# Patient Record
Sex: FEMALE | Race: WHITE | Employment: OTHER | URBAN - METROPOLITAN AREA
[De-identification: names, ages, dates, MRNs, and addresses within clinical notes are randomized per-mention and may not be internally consistent; named-entity substitution may affect disease eponyms.]

---

## 2017-01-06 ENCOUNTER — OFFICE VISIT (OUTPATIENT)
Dept: FAMILY MEDICINE | Facility: CLINIC | Age: 37
End: 2017-01-06
Payer: COMMERCIAL

## 2017-01-06 VITALS
DIASTOLIC BLOOD PRESSURE: 80 MMHG | SYSTOLIC BLOOD PRESSURE: 108 MMHG | WEIGHT: 184.25 LBS | BODY MASS INDEX: 27.92 KG/M2 | HEIGHT: 68 IN | HEART RATE: 60 BPM

## 2017-01-06 DIAGNOSIS — B07.0 PLANTAR WARTS: Primary | ICD-10-CM

## 2017-01-06 PROCEDURE — 17110 DESTRUCTION B9 LES UP TO 14: CPT | Performed by: FAMILY MEDICINE

## 2017-01-06 PROCEDURE — 99207 ZZC DROP WITH A PROCEDURE: CPT | Performed by: FAMILY MEDICINE

## 2017-01-06 NOTE — NURSING NOTE
"Initial /80 mmHg  Pulse 60  Ht 5' 7.5\" (1.715 m)  Wt 184 lb 4 oz (83.575 kg)  BMI 28.42 kg/m2 Estimated body mass index is 28.42 kg/(m^2) as calculated from the following:    Height as of this encounter: 5' 7.5\" (1.715 m).    Weight as of this encounter: 184 lb 4 oz (83.575 kg). .        "

## 2017-01-06 NOTE — PROGRESS NOTES
"  SUBJECTIVE:                                                    Silvia Chopra is a 36 year old female who presents to clinic today for the following health issues:    - Removal of corn on bottom of right foot, 4th toe.      ROS:  Constitutional, HEENT, cardiovascular, pulmonary, gi and gu systems are negative, except as otherwise noted.    This document serves as a record of the services and decisions personally performed and made by Ayla Uribe MD. It was created on his behalf by Cesar Dang, a trained medical scribe. The creation of this document is based the provider's statements to the medical scribe.  Cesar Dang 4:33 PM January 6, 2017      OBJECTIVE:                                                    /80 mmHg  Pulse 60  Ht 5' 7.5\" (1.715 m)  Wt 184 lb 4 oz (83.575 kg)  BMI 28.42 kg/m2  Body mass index is 28.42 kg/(m^2).     GENERAL: healthy, alert and no distress  EYES: Eyes grossly normal to inspection, conjunctivae and sclerae normal  MS: no gross musculoskeletal defects noted, no edema  SKIN: hyperkeratotic lesion on the 4th toe of right foot  NEURO: Normal strength and tone, mentation intact and speech normal  PSYCH: mentation appears normal, affect normal/bright     ASSESSMENT/PLAN:                                                      (B07.0) Plantar warts  (primary encounter diagnosis)  Comment: The lesion was frozen ×3 using liquid nitrogen. There are no immediate complications. Denies to watch for infection, scarring, and if lesion doesn't resolve in one month, follow up for retreatment.  Plan: DESTRUCT BENIGN LESION, UP TO 14        Total number of lesions treated: One.        There are no Patient Instructions on file for this visit.    Patient will follow up if symptoms worsen or do not improve. Patient instructed to call with any questions or concerns.    The information in this document, created by a scribe for me, accurately reflects the services I personally performed " and the decisions made by me. I have reviewed and approved this document for accuracy. 4:39 PM1/6/2017    Ayla Uribe MD  [facility]

## 2017-03-09 DIAGNOSIS — Z20.828 EXPOSURE TO INFLUENZA: Primary | ICD-10-CM

## 2017-03-09 RX ORDER — OSELTAMIVIR PHOSPHATE 75 MG/1
75 CAPSULE ORAL DAILY
Qty: 10 CAPSULE | Refills: 0 | Status: SHIPPED | OUTPATIENT
Start: 2017-03-09 | End: 2017-06-19

## 2017-04-06 ENCOUNTER — TRANSFERRED RECORDS (OUTPATIENT)
Dept: HEALTH INFORMATION MANAGEMENT | Facility: CLINIC | Age: 37
End: 2017-04-06

## 2017-06-19 ENCOUNTER — OFFICE VISIT (OUTPATIENT)
Dept: FAMILY MEDICINE | Facility: CLINIC | Age: 37
End: 2017-06-19
Payer: COMMERCIAL

## 2017-06-19 VITALS
DIASTOLIC BLOOD PRESSURE: 68 MMHG | HEART RATE: 68 BPM | SYSTOLIC BLOOD PRESSURE: 122 MMHG | TEMPERATURE: 98.5 F | WEIGHT: 190 LBS | HEIGHT: 68 IN | BODY MASS INDEX: 28.79 KG/M2

## 2017-06-19 DIAGNOSIS — L84 CORN OR CALLUS: Primary | ICD-10-CM

## 2017-06-19 PROCEDURE — 17110 DESTRUCTION B9 LES UP TO 14: CPT | Performed by: PHYSICIAN ASSISTANT

## 2017-06-19 NOTE — PROGRESS NOTES
"  SUBJECTIVE:                                                    Silvia Chopra is a 36 year old female who presents to clinic today for the following health issues:      Concern - Wart or Corn     Onset: 3 months     Description:   In between her 4th toe on each foot, painful     Intensity: moderate    Progression of Symptoms:  Worsening, gotten bigger    Accompanying Signs & Symptoms:  None       Previous history of similar problem: Yes      Precipitating factors:   Worsened by: None     Alleviating factors:  Improved by: None        Therapies Tried and outcome: Otc, does not work       Has 2 either warts or calluses on the side of her 4th digit closest to her 5th toe on each foot   Had the one on her right toe frozen a few months ago - says the lesion did go away or shrink significantly in size but has since been coming back - not as bad as the other side    Problem list and histories reviewed & adjusted, as indicated.  Additional history: as documented    Current Outpatient Prescriptions   Medication Sig Dispense Refill     MINOCYCLINE HCL PO Take 100 mg by mouth 2 times daily       sertraline (ZOLOFT) 50 MG tablet Take  by mouth daily.       order for DME Equipment being ordered: epicondylitis support band 1 Box 0     Allergies   Allergen Reactions     Ropinirole Nausea and Vomiting       Reviewed and updated as needed this visit by clinical staff       Reviewed and updated as needed this visit by Provider         ROS:  Remainder of ROS obtained and found to be negative other than that which was documented above      OBJECTIVE:                                                    /68 (BP Location: Right arm, Patient Position: Chair, Cuff Size: Adult Regular)  Temp 98.5  F (36.9  C) (Tympanic)  Ht 5' 7.5\" (1.715 m)  Wt 190 lb (86.2 kg)  BMI 29.32 kg/m2  Body mass index is 29.32 kg/(m^2).  GENERAL: healthy, alert and no distress  SKIN: circular area of thickened skin tissue located on the lateral " surface of the 4th toe on both right and left side although more prominent on left side.     Diagnostic Test Results:  none      ASSESSMENT/PLAN:                                                    (L84) Corn or callus  (primary encounter diagnosis)  Comment: Lesions seem to be more consistent with corns on exam as I don't seen the dark specks consistent with a wart. Discussed treatment of corns including salicyclic acid. She will try this but given she had some decrease in size with the liquid nitrogen would also like lesions frozen today.   Plan: salicylic acid (MEDIPLAST) 40 % MISC         After informed consent was obtained, both lesions on each toe (total of 2 lesions) were frozen in 3 consecutive freeze/thaw cycles        Isaura Rae PA-C  [facility]

## 2017-06-19 NOTE — NURSING NOTE
"Chief Complaint   Patient presents with     Corn Removal       Initial /68 (BP Location: Right arm, Patient Position: Chair, Cuff Size: Adult Regular)  Temp 98.5  F (36.9  C) (Tympanic)  Ht 5' 7.5\" (1.715 m)  Wt 190 lb (86.2 kg)  BMI 29.32 kg/m2 Estimated body mass index is 29.32 kg/(m^2) as calculated from the following:    Height as of this encounter: 5' 7.5\" (1.715 m).    Weight as of this encounter: 190 lb (86.2 kg).  Medication Reconciliation: complete     Frankie Sauceda CMA    "

## 2017-06-19 NOTE — MR AVS SNAPSHOT
"              After Visit Summary   2017    Silvia Chopra    MRN: [id number]           Patient Information     Date Of Birth          1980        Visit Information        Provider Department      2017 3:00 PM Isaura Rae PA-C [facility]        Today's Diagnoses     Corn or callus    -  1       Follow-ups after your visit        Who to contact     Normal or non-critical lab and imaging results will be communicated to you by MyChart, letter or phone within 4 business days after the clinic has received the results. If you do not hear from us within 7 days, please contact the clinic through MyChart or phone. If you have a critical or abnormal lab result, we will notify you by phone as soon as possible.  Submit refill requests through [employer] or call your pharmacy and they will forward the refill request to us. Please allow 3 business days for your refill to be completed.          If you need to speak with a  for additional information , please call: [phone]             Additional Information About Your Visit        [employer] Information     [employer] lets you send messages to your doctor, view your test results, renew your prescriptions, schedule appointments and more. To sign up, go to [email or web address] . Click on \"Log in\" on the left side of the screen, which will take you to the Welcome page. Then click on \"Sign up Now\" on the right side of the page.     You will be asked to enter the access code listed below, as well as some personal information. Please follow the directions to create your username and password.     Your access code is: [email or web address]  Expires: 2017  3:24 PM     Your access code will  in 90 days. If you need help or a new code, please call your [facility] clinic or [phone].        Care EveryWhere ID     This is your Care EveryWhere ID. This could be used by other organizations to access your [facility] medical " "records  [id number]        Your Vitals Were     Pulse Temperature Height BMI (Body Mass Index)          68 98.5  F (36.9  C) (Tympanic) 5' 7.5\" (1.715 m) 29.32 kg/m2         Blood Pressure from Last 3 Encounters:   06/19/17 122/68   01/06/17 108/80   08/08/16 122/82    Weight from Last 3 Encounters:   06/19/17 190 lb (86.2 kg)   01/06/17 184 lb 4 oz (83.6 kg)   08/08/16 189 lb 3.2 oz (85.8 kg)              Today, you had the following     No orders found for display         Today's Medication Changes          These changes are accurate as of: 6/19/17  3:25 PM.  If you have any questions, ask your nurse or doctor.               Start taking these medicines.        Dose/Directions    salicylic acid 40 % Misc   Commonly known as:  MEDIPLAST   Used for:  Corn or callus   Started by:  Isaura Rae PA-C        Dose:  1 dose *   Apply 1 dose * topically At Bedtime Each night, Scrape or loofa the wart(s) and then soak foot for 10-15 min in warm water.  Cut plaster to fit exactly over 1 wart each.  Tape each in place for overnight and remove the next morning.   Quantity:  1 each   Refills:  11            Where to get your medicines      Some of these will need a paper prescription and others can be bought over the counter.  Ask your nurse if you have questions.     Bring a paper prescription for each of these medications     salicylic acid 40 % Misc                Primary Care Provider Office Phone # Fax #    Justin Coombsning GarayshonaDO [phone]       [facility] [address] [facility] [address]        Thank you!     Thank you for choosing [facility]  for your care. Our goal is always to provide you with excellent care. Hearing back from our patients is one way we can continue to improve our services. Please take a few minutes to complete the written survey that you may receive in the mail after your visit with us. Thank you!             Your Updated " Medication List - Protect others around you: Learn how to safely use, store and throw away your medicines at www.disposemymeds.org.          This list is accurate as of: 6/19/17  3:25 PM.  Always use your most recent med list.                   Brand Name Dispense Instructions for use    MINOCYCLINE HCL PO      Take 100 mg by mouth 2 times daily       order for DME     1 Box    Equipment being ordered: epicondylitis support band       salicylic acid 40 % Misc    MEDIPLAST    1 each    Apply 1 dose * topically At Bedtime Each night, Scrape or loofa the wart(s) and then soak foot for 10-15 min in warm water.  Cut plaster to fit exactly over 1 wart each.  Tape each in place for overnight and remove the next morning.       ZOLOFT 50 MG tablet   Generic drug:  sertraline      Take  by mouth daily.

## 2018-01-08 LAB
HPV ABSTRACT: NORMAL
PAP-ABSTRACT: NORMAL

## 2018-06-04 LAB — TSH SERPL-ACNC: 1.2 UIU/ML (ref 0.35–4.94)

## 2018-12-14 ENCOUNTER — OFFICE VISIT (OUTPATIENT)
Dept: FAMILY MEDICINE | Facility: CLINIC | Age: 38
End: 2018-12-14
Payer: COMMERCIAL

## 2018-12-14 VITALS
HEIGHT: 68 IN | BODY MASS INDEX: 30.49 KG/M2 | SYSTOLIC BLOOD PRESSURE: 110 MMHG | OXYGEN SATURATION: 98 % | TEMPERATURE: 97.7 F | WEIGHT: 201.2 LBS | HEART RATE: 75 BPM | RESPIRATION RATE: 24 BRPM | DIASTOLIC BLOOD PRESSURE: 70 MMHG

## 2018-12-14 DIAGNOSIS — Z20.818 EXPOSURE TO STREP THROAT: Primary | ICD-10-CM

## 2018-12-14 DIAGNOSIS — R68.89 FLU-LIKE SYMPTOMS: ICD-10-CM

## 2018-12-14 LAB
DEPRECATED S PYO AG THROAT QL EIA: NORMAL
FLUAV+FLUBV AG SPEC QL: NEGATIVE
FLUAV+FLUBV AG SPEC QL: NEGATIVE
SPECIMEN SOURCE: NORMAL
SPECIMEN SOURCE: NORMAL

## 2018-12-14 PROCEDURE — 87804 INFLUENZA ASSAY W/OPTIC: CPT | Performed by: FAMILY MEDICINE

## 2018-12-14 PROCEDURE — 99213 OFFICE O/P EST LOW 20 MIN: CPT | Performed by: FAMILY MEDICINE

## 2018-12-14 PROCEDURE — 87081 CULTURE SCREEN ONLY: CPT | Performed by: FAMILY MEDICINE

## 2018-12-14 PROCEDURE — 87880 STREP A ASSAY W/OPTIC: CPT | Performed by: FAMILY MEDICINE

## 2018-12-14 RX ORDER — SULFAMETHOXAZOLE/TRIMETHOPRIM 800-160 MG
1 TABLET ORAL
COMMUNITY
Start: 2017-08-10

## 2018-12-14 ASSESSMENT — MIFFLIN-ST. JEOR: SCORE: 1643.2

## 2018-12-14 ASSESSMENT — PAIN SCALES - GENERAL: PAINLEVEL: EXTREME PAIN (8)

## 2018-12-14 NOTE — PATIENT INSTRUCTIONS
Your rapid strep and flu tests were negative.  We'll let you know the strep culture results when available.    I would recommend symptomatic care for now with rest, fluids, ibuprofen/tylenol as needed.  If symptoms worsen or fail to improve over the next 3-4 days please seek additional care

## 2018-12-14 NOTE — PROGRESS NOTES
SUBJECTIVE:   Silvia Chopra is a 38 year old female who presents to clinic today for the following health issues:      ENT Symptoms             Symptoms: cc Present Absent Comment   Fever/Chills  x  Feels hot and cold, no measured temps   Fatigue  x     Muscle Aches  x     Eye Irritation  x  Eyes are burning   Sneezing   x    Nasal Jon/Drg  x     Sinus Pressure/Pain   x    Loss of smell  x     Dental pain   x    Sore Throat  x     Swollen Glands   x    Ear Pain/Fullness   x    Cough x x  Producing yellow mucus   Wheeze   x    Chest Pain   x    Shortness of breath  x     Rash   x    Other  x  headache     Symptom duration:  1 days   Symptom severity:  mild   Treatments tried:  ibuprofen, Tylenol, Cold and flu medication   Contacts:  , child with strep       Son with strep lat week and  with URI this week.    Feels like current symptoms come on quickly.  Began with sore throat last night and woke with cough in the middle of the night.        Problem list and histories reviewed & adjusted, as indicated.  Additional history: as documented      Reviewed and updated as needed this visit by clinical staff  Tobacco  Allergies  Meds  Med Hx  Surg Hx  Fam Hx  Soc Hx      Reviewed and updated as needed this visit by Provider  Tobacco  Med Hx  Surg Hx  Fam Hx  Soc Hx        ROS: Remainder of Constitutional, CV, Respiratory, GI,  negative with exception of that mentioned above    PE:  VS as above   Gen:  WN/WD/WH female in NAD   HEENT:  NC/AT, conjunctiva wnl, TM's wnl johanne pearly gray   with good light reflex, oropharynx clear without    exudate/erythema   Neck:  supple, no LAD appreciated   Heart:  RRR without murmur, nl S1, S2, no rubs or gallops   Lungs CTA johanne without rales/ronchi/wheezes   Ext:  No pedal edema    Results for orders placed or performed in visit on 12/14/18   Rapid strep screen   Result Value Ref Range    Specimen Description Throat     Rapid Strep A Screen       NEGATIVE: No  Group A streptococcal antigen detected by immunoassay, await culture report.   Influenza A/B antigen   Result Value Ref Range    Influenza A/B Agn Specimen Nasal     Influenza A Negative NEG^Negative    Influenza B Negative NEG^Negative     A/P:      ICD-10-CM    1. Exposure to strep throat Z20.818 Rapid strep screen     Beta strep group A culture   2. Flu-like symptoms R68.89 Influenza A/B antigen     Patient Instructions   Your rapid strep and flu tests were negative.  We'll let you know the strep culture results when available.    I would recommend symptomatic care for now with rest, fluids, ibuprofen/tylenol as needed.  If symptoms worsen or fail to improve over the next 3-4 days please seek additional care

## 2018-12-14 NOTE — LETTER
December 17, 2018      Silvia Chopra  [address] [facility] [address]        Dear ,    We are writing to inform you of your test results.    Your strep culture was negative as well.  No treatment is indicated.    Resulted Orders   Rapid strep screen   Result Value Ref Range    Specimen Description Throat     Rapid Strep A Screen       NEGATIVE: No Group A streptococcal antigen detected by immunoassay, await culture report.   Beta strep group A culture   Result Value Ref Range    Specimen Description Throat     Culture Micro No beta hemolytic Streptococcus Group A isolated    Influenza A/B antigen   Result Value Ref Range    Influenza A/B Agn Specimen Nasal     Influenza A Negative NEG^Negative    Influenza B Negative NEG^Negative      Comment:      Test results must be correlated with clinical data. If necessary, results   should be confirmed by a molecular assay or viral culture.         If you have any questions or concerns, please call the clinic at the number listed above.       Sincerely,        Selin Garcia DO

## 2018-12-14 NOTE — PROGRESS NOTES
"  SUBJECTIVE:   Silvia Chopra is a 38 year old female who presents to clinic today for the following health issues:      ENT Symptoms             Symptoms: cc Present Absent Comment   Fever/Chills       Fatigue       Muscle Aches       Eye Irritation       Sneezing       Nasal Jon/Drg       Sinus Pressure/Pain       Loss of smell       Dental pain       Sore Throat       Swollen Glands       Ear Pain/Fullness       Cough       Wheeze       Chest Pain       Shortness of breath       Rash       Other         Symptom duration:  ***   Symptom severity:  ***   Treatments tried:  ***   Contacts:  ***         {additional problems for provider to add:148710}    Problem list and histories reviewed & adjusted, as indicated.  Additional history: {NONE - AS DOCUMENTED:837854::\"as documented\"}    {HIST REVIEW/ LINKS 2:025161}    Reviewed and updated as needed this visit by clinical staff       Reviewed and updated as needed this visit by Provider         ROS:  Review of Systems      OBJECTIVE:     There were no vitals taken for this visit.  There is no height or weight on file to calculate BMI.  Physical Exam    {Diagnostic Test Results:890331::\"Diagnostic Test Results:\",\"none \"}    ASSESSMENT/PLAN:     {Associate for [employer]}    {Quality Requirements:477209}    {Diag Picklist:401233}    {Follow-Up:169006}    Selin Garcia,   [facility]      "

## 2018-12-15 LAB
BACTERIA SPEC CULT: NORMAL
SPECIMEN SOURCE: NORMAL

## 2019-06-05 ENCOUNTER — TRANSFERRED RECORDS (OUTPATIENT)
Dept: MULTI SPECIALTY CLINIC | Facility: CLINIC | Age: 39
End: 2019-06-05

## 2019-06-05 LAB
ALT SERPL-CCNC: 14 IU/L (ref 8–45)
AST SERPL-CCNC: 22 IU/L (ref 2–40)
CHOLEST SERPL-MCNC: 235 MG/DL (ref 100–199)
GLUCOSE SERPL-MCNC: 85 MG/DL (ref 65–140)
HDLC SERPL-MCNC: 40 MG/DL
LDLC SERPL CALC-MCNC: 155 MG/DL
NONHDLC SERPL-MCNC: 195 MG/DL
TRIGL SERPL-MCNC: 198 MG/DL

## 2019-10-17 ENCOUNTER — IMMUNIZATION (OUTPATIENT)
Dept: FAMILY MEDICINE | Facility: CLINIC | Age: 39
End: 2019-10-17
Payer: COMMERCIAL

## 2019-10-17 DIAGNOSIS — Z23 NEED FOR PROPHYLACTIC VACCINATION AND INOCULATION AGAINST INFLUENZA: Primary | ICD-10-CM

## 2019-10-17 PROCEDURE — 90471 IMMUNIZATION ADMIN: CPT

## 2019-10-17 PROCEDURE — 90686 IIV4 VACC NO PRSV 0.5 ML IM: CPT

## 2019-10-29 ENCOUNTER — OFFICE VISIT (OUTPATIENT)
Dept: FAMILY MEDICINE | Facility: CLINIC | Age: 39
End: 2019-10-29
Payer: COMMERCIAL

## 2019-10-29 VITALS
SYSTOLIC BLOOD PRESSURE: 118 MMHG | HEART RATE: 81 BPM | HEIGHT: 69 IN | RESPIRATION RATE: 20 BRPM | DIASTOLIC BLOOD PRESSURE: 78 MMHG | BODY MASS INDEX: 30.16 KG/M2 | OXYGEN SATURATION: 98 % | TEMPERATURE: 96.8 F | WEIGHT: 203.6 LBS

## 2019-10-29 DIAGNOSIS — J06.9 UPPER RESPIRATORY TRACT INFECTION, UNSPECIFIED TYPE: ICD-10-CM

## 2019-10-29 DIAGNOSIS — R05.9 COUGH: Primary | ICD-10-CM

## 2019-10-29 DIAGNOSIS — R07.0 THROAT PAIN: ICD-10-CM

## 2019-10-29 PROCEDURE — 87804 INFLUENZA ASSAY W/OPTIC: CPT | Performed by: NURSE PRACTITIONER

## 2019-10-29 PROCEDURE — 87081 CULTURE SCREEN ONLY: CPT | Performed by: NURSE PRACTITIONER

## 2019-10-29 PROCEDURE — 87880 STREP A ASSAY W/OPTIC: CPT | Performed by: NURSE PRACTITIONER

## 2019-10-29 PROCEDURE — 99213 OFFICE O/P EST LOW 20 MIN: CPT | Performed by: NURSE PRACTITIONER

## 2019-10-29 RX ORDER — AZITHROMYCIN 250 MG/1
TABLET, FILM COATED ORAL
Qty: 6 TABLET | Refills: 0 | Status: SHIPPED | OUTPATIENT
Start: 2019-10-29 | End: 2019-11-03

## 2019-10-29 ASSESSMENT — ANXIETY QUESTIONNAIRES
IF YOU CHECKED OFF ANY PROBLEMS ON THIS QUESTIONNAIRE, HOW DIFFICULT HAVE THESE PROBLEMS MADE IT FOR YOU TO DO YOUR WORK, TAKE CARE OF THINGS AT HOME, OR GET ALONG WITH OTHER PEOPLE: NOT DIFFICULT AT ALL
GAD7 TOTAL SCORE: 0
7. FEELING AFRAID AS IF SOMETHING AWFUL MIGHT HAPPEN: NOT AT ALL
2. NOT BEING ABLE TO STOP OR CONTROL WORRYING: NOT AT ALL
5. BEING SO RESTLESS THAT IT IS HARD TO SIT STILL: NOT AT ALL
3. WORRYING TOO MUCH ABOUT DIFFERENT THINGS: NOT AT ALL
1. FEELING NERVOUS, ANXIOUS, OR ON EDGE: NOT AT ALL
6. BECOMING EASILY ANNOYED OR IRRITABLE: NOT AT ALL

## 2019-10-29 ASSESSMENT — PATIENT HEALTH QUESTIONNAIRE - PHQ9
SUM OF ALL RESPONSES TO PHQ QUESTIONS 1-9: 2
5. POOR APPETITE OR OVEREATING: NOT AT ALL

## 2019-10-29 ASSESSMENT — MIFFLIN-ST. JEOR: SCORE: 1664.96

## 2019-10-29 ASSESSMENT — PAIN SCALES - GENERAL: PAINLEVEL: SEVERE PAIN (6)

## 2019-10-29 NOTE — LETTER
October 30, 2019      Silvia MICHAEL Chopra  [address] [facility] [address]        Dear ,    We are writing to inform you of your test results.    Normal/Negative    Resulted Orders   Rapid strep screen   Result Value Ref Range    Specimen Description Throat     Rapid Strep A Screen       NEGATIVE: No Group A streptococcal antigen detected by immunoassay, await culture report.   Influenza A/B antigen   Result Value Ref Range    Influenza A/B Agn Specimen Nasal     Influenza A Negative NEG^Negative    Influenza B Negative NEG^Negative      Comment:      Test results must be correlated with clinical data. If necessary, results   should be confirmed by a molecular assay or viral culture.     Beta strep group A culture   Result Value Ref Range    Specimen Description Throat     Culture Micro No beta hemolytic Streptococcus Group A isolated        If you have any questions or concerns, please call the clinic at the number listed above.       Sincerely,        KITA Miguel CNP

## 2019-10-29 NOTE — LETTER
My Depression Action Plan  Name: Silvia Chopra   Date of Birth 1980  Date: 10/29/2019    My doctor: Justin Fisher   My clinic: [address] [address] [phone]          GREEN    ZONE   Good Control    What it looks like:     Things are going generally well. You have normal up s and down s. You may even feel depressed from time to time, but bad moods usually last less than a day.   What you need to do:  1. Continue to care for yourself (see self care plan)  2. Check your depression survival kit and update it as needed  3. Follow your physician s recommendations including any medication.  4. Do not stop taking medication unless you consult with your physician first.           YELLOW         ZONE Getting Worse    What it looks like:     Depression is starting to interfere with your life.     It may be hard to get out of bed; you may be starting to isolate yourself from others.    Symptoms of depression are starting to last most all day and this has happened for several days.     You may have suicidal thoughts but they are not constant.   What you need to do:     1. Call your care team, your response to treatment will improve if you keep your care team informed of your progress. Yellow periods are signs an adjustment may need to be made.     2. Continue your self-care, even if you have to fake it!    3. Talk to someone in your support network    4. Open up your depression survival kit           RED    ZONE Medical Alert - Get Help    What it looks like:     Depression is seriously interfering with your life.     You may experience these or other symptoms: You can t get out of bed most days, can t work or engage in other necessary activities, you have trouble taking care of basic hygiene, or basic responsibilities, thoughts of suicide or death that will not go away, self-injurious behavior.     What you need to do:  1. Call your  care team and request a same-day appointment. If they are not available (weekends or after hours) call your local crisis line, emergency room or 911.            Depression Self Care Plan / Survival Kit    Self-Care for Depression  Here s the deal. Your body and mind are really not as separate as most people think.  What you do and think affects how you feel and how you feel influences what you do and think. This means if you do things that people who feel good do, it will help you feel better.  Sometimes this is all it takes.  There is also a place for medication and therapy depending on how severe your depression is, so be sure to consult with your medical provider and/ or Behavioral Health Consultant if your symptoms are worsening or not improving.     In order to better manage my stress, I will:    Exercise  Get some form of exercise, every day. This will help reduce pain and release endorphins, the  feel good  chemicals in your brain. This is almost as good as taking antidepressants!  This is not the same as joining a gym and then never going! (they count on that by the way ) It can be as simple as just going for a walk or doing some gardening, anything that will get you moving.      Hygiene   Maintain good hygiene (Get out of bed in the morning, Make your bed, Brush your teeth, Take a shower, and Get dressed like you were going to work, even if you are unemployed).  If your clothes don't fit try to get ones that do.    Diet  I will strive to eat foods that are good for me, drink plenty of water, and avoid excessive sugar, caffeine, alcohol, and other mood-altering substances.  Some foods that are helpful in depression are: complex carbohydrates, B vitamins, flaxseed, fish or fish oil, fresh fruits and vegetables.    Psychotherapy  I agree to participate in Individual Therapy (if recommended).    Medication  If prescribed medications, I agree to take them.  Missing doses can result in serious side effects.  I  understand that drinking alcohol, or other illicit drug use, may cause potential side effects.  I will not stop my medication abruptly without first discussing it with my provider.    Staying Connected With Others  I will stay in touch with my friends, family members, and my primary care provider/team.    Use your imagination  Be creative.  We all have a creative side; it doesn t matter if it s oil painting, sand castles, or mud pies! This will also kick up the endorphins.    Witness Beauty  (AKA stop and smell the roses) Take a look outside, even in mid-winter. Notice colors, textures. Watch the squirrels and birds.     Service to others  Be of service to others.  There is always someone else in need.  By helping others we can  get out of ourselves  and remember the really important things.  This also provides opportunities for practicing all the other parts of the program.    Humor  Laugh and be silly!  Adjust your TV habits for less news and crime-drama and more comedy.    Control your stress  Try breathing deep, massage therapy, biofeedback, and meditation. Find time to relax each day.     My support system    Clinic Contact:  Phone number:    Contact 1:  Phone number:    Contact 2:  Phone number:    Religion/:  Phone number:    Therapist:  Phone number:    Local crisis center:    Phone number:    Other community support:  Phone number:

## 2019-10-29 NOTE — PROGRESS NOTES
Subjective     Silvia Chopra is a 39 year old female who presents to clinic today for the following health issues:      HPI     ENT Symptoms             Symptoms: cc Present Absent Comment   Fever/Chills  x  Feeling fevering-no measured temps   Fatigue  x     Muscle Aches  x     Eye Irritation   x    Sneezing   x    Nasal Jon/Drg   x    Sinus Pressure/Pain   x    Loss of smell   x    Dental pain   x    Sore Throat  x  With coughing   Swollen Glands   x    Ear Pain/Fullness  x  Left ear pain   Cough x x  Dry and productive   Wheeze  x     Chest Pain  x     Shortness of breath  x     Rash   x    Other  x  headache     Symptom duration:  2 weeks   Symptom severity:  moderate   Treatments tried:  Cough Medication, Cough Drops, Sleep   Contacts:  son had pneumonia 2 weeks ago     Has not been feeling well for the last 2 weeks. Has felt warm at home, but does not have a thermometer. Left ear pain off and on. Throat is sore.     Current Outpatient Medications   Medication Sig Dispense Refill     MINOCYCLINE HCL PO Take 100 mg by mouth 2 times daily       Omeprazole (PRILOSEC PO)        sertraline (ZOLOFT) 50 MG tablet Take  by mouth daily.       sulfamethoxazole-trimethoprim (BACTRIM DS/SEPTRA DS) 800-160 MG tablet Take 1 tablet by mouth       Allergies   Allergen Reactions     Ropinirole Nausea and Vomiting       Reviewed and updated as needed this visit by Provider             Objective    There were no vitals taken for this visit.  There is no height or weight on file to calculate BMI.          Assessment & Plan      Review of Systems    Physical Exam  Constitutional:       Appearance: She is well-developed.   HENT:      Right Ear: Tympanic membrane and external ear normal. No middle ear effusion. Tympanic membrane is not erythematous.      Left Ear: Tympanic membrane and external ear normal.  No middle ear effusion. Tympanic membrane is not erythematous.      Nose: No mucosal edema or rhinorrhea.       Mouth/Throat:     Cardiovascular:      Rate and Rhythm: Normal rate and regular rhythm.      Heart sounds: Normal heart sounds.   Pulmonary:      Effort: Pulmonary effort is normal.      Breath sounds: Normal breath sounds.   Abdominal:      General: Bowel sounds are normal.      Palpations: Abdomen is soft.   Skin:     General: Skin is warm and dry.   Neurological:      Mental Status: She is alert.           1. Cough  - Influenza A/B antigen    2. Throat pain  - Rapid strep screen  - Beta strep group A culture    3. Upper respiratory tract infection, unspecified type  Reviewed treatment plan.   Reviewed fever and pain control with tylenol and/or ibuprofen  Instructed to call or return for:  --Symptoms not improved in the next 3 days  --Worsening symptom  --New unexplained symptoms develop     - azithromycin (ZITHROMAX) 250 MG tablet; Take 2 tablets (500 mg) by mouth daily for 1 day, THEN 1 tablet (250 mg) daily for 4 days.  Dispense: 6 tablet; Refill: 0     No follow-ups on file.    KITA [facility]

## 2019-10-30 LAB
BACTERIA SPEC CULT: NORMAL
SPECIMEN SOURCE: NORMAL

## 2019-10-30 ASSESSMENT — ANXIETY QUESTIONNAIRES: GAD7 TOTAL SCORE: 0

## 2020-10-22 ENCOUNTER — IMMUNIZATION (OUTPATIENT)
Dept: FAMILY MEDICINE | Facility: CLINIC | Age: 40
End: 2020-10-22
Payer: COMMERCIAL

## 2020-10-22 DIAGNOSIS — Z23 NEED FOR PROPHYLACTIC VACCINATION AND INOCULATION AGAINST INFLUENZA: Primary | ICD-10-CM

## 2020-10-22 PROCEDURE — 90471 IMMUNIZATION ADMIN: CPT

## 2020-10-22 PROCEDURE — 99207 PR NO CHARGE NURSE ONLY: CPT

## 2020-10-22 PROCEDURE — 90686 IIV4 VACC NO PRSV 0.5 ML IM: CPT

## 2022-01-04 ENCOUNTER — ALLIED HEALTH/NURSE VISIT (OUTPATIENT)
Dept: FAMILY MEDICINE | Facility: CLINIC | Age: 42
End: 2022-01-04
Payer: COMMERCIAL

## 2022-01-04 DIAGNOSIS — Z23 NEED FOR PROPHYLACTIC VACCINATION AND INOCULATION AGAINST INFLUENZA: ICD-10-CM

## 2022-01-04 DIAGNOSIS — Z23 HIGH PRIORITY FOR 2019-NCOV VACCINE: Primary | ICD-10-CM

## 2022-01-04 PROCEDURE — 90686 IIV4 VACC NO PRSV 0.5 ML IM: CPT

## 2022-01-04 PROCEDURE — 0004A COVID-19,PF,PFIZER (12+ YRS): CPT

## 2022-01-04 PROCEDURE — 91300 COVID-19,PF,PFIZER (12+ YRS): CPT

## 2022-01-04 PROCEDURE — 99207 PR NO CHARGE NURSE ONLY: CPT

## 2022-01-04 PROCEDURE — 90471 IMMUNIZATION ADMIN: CPT
